# Patient Record
Sex: MALE | Race: WHITE | Employment: FULL TIME | URBAN - NONMETROPOLITAN AREA
[De-identification: names, ages, dates, MRNs, and addresses within clinical notes are randomized per-mention and may not be internally consistent; named-entity substitution may affect disease eponyms.]

---

## 2023-07-18 ENCOUNTER — HOSPITAL ENCOUNTER (OUTPATIENT)
Age: 53
Discharge: HOME | End: 2023-07-18
Payer: COMMERCIAL

## 2023-07-18 LAB
ALT SERPL-CCNC: 30 U/L (ref 5–41)
ANION GAP SERPL CALCULATED.3IONS-SCNC: 11 MMOL/L (ref 9–17)
AST SERPL-CCNC: 26 U/L
BUN SERPL-MCNC: 11 MG/DL (ref 6–20)
BUN/CREAT SERPL: 12 (ref 9–20)
CALCIUM SERPL-MCNC: 9.7 MG/DL (ref 8.6–10.4)
CHLORIDE SERPL-SCNC: 104 MMOL/L (ref 98–107)
CHOLEST SERPL-MCNC: 90 MG/DL
CHOLESTEROL/HDL RATIO: 2.4
CO2 SERPL-SCNC: 28 MMOL/L (ref 20–31)
CREAT SERPL-MCNC: 0.9 MG/DL (ref 0.7–1.2)
GFR SERPL CREATININE-BSD FRML MDRD: >60 ML/MIN/1.73M2
GLUCOSE SERPL-MCNC: 91 MG/DL (ref 70–99)
HDLC SERPL-MCNC: 37 MG/DL
LDLC SERPL CALC-MCNC: 36 MG/DL (ref 0–130)
POTASSIUM SERPL-SCNC: 3.8 MMOL/L (ref 3.7–5.3)
SODIUM SERPL-SCNC: 143 MMOL/L (ref 135–144)
TRIGL SERPL-MCNC: 87 MG/DL

## 2023-07-18 PROCEDURE — 36415 COLL VENOUS BLD VENIPUNCTURE: CPT

## 2023-07-18 PROCEDURE — 84450 TRANSFERASE (AST) (SGOT): CPT

## 2023-07-18 PROCEDURE — 80061 LIPID PANEL: CPT

## 2023-07-18 PROCEDURE — 80048 BASIC METABOLIC PNL TOTAL CA: CPT

## 2023-07-18 PROCEDURE — 84460 ALANINE AMINO (ALT) (SGPT): CPT

## 2023-08-08 ENCOUNTER — APPOINTMENT (OUTPATIENT)
Dept: OPHTHALMOLOGY | Facility: CLINIC | Age: 53
End: 2023-08-08
Payer: COMMERCIAL

## 2023-08-08 ENCOUNTER — NON-APPOINTMENT (OUTPATIENT)
Age: 53
End: 2023-08-08

## 2023-08-08 PROCEDURE — 92004 COMPRE OPH EXAM NEW PT 1/>: CPT

## 2023-10-23 PROBLEM — E78.2 MIXED HYPERLIPIDEMIA: Status: ACTIVE | Noted: 2023-10-23

## 2023-10-23 PROBLEM — I25.10 ATHEROSCLEROSIS OF NATIVE CORONARY ARTERY: Status: ACTIVE | Noted: 2023-10-23

## 2023-10-23 PROBLEM — Z87.891 FORMER SMOKER: Status: ACTIVE | Noted: 2023-10-23

## 2023-10-23 PROBLEM — I25.5 ISCHEMIC CARDIOMYOPATHY: Status: ACTIVE | Noted: 2023-10-23

## 2023-10-23 PROBLEM — I21.3 ST ELEVATION (STEMI) MYOCARDIAL INFARCTION (MULTI): Status: ACTIVE | Noted: 2023-10-23

## 2023-10-23 RX ORDER — ASPIRIN 81 MG/1
81 TABLET ORAL DAILY
COMMUNITY
End: 2024-05-23 | Stop reason: SDUPTHER

## 2023-10-23 RX ORDER — ATORVASTATIN CALCIUM 80 MG/1
80 TABLET, FILM COATED ORAL NIGHTLY
COMMUNITY
End: 2024-05-20 | Stop reason: SDUPTHER

## 2023-10-23 RX ORDER — METOPROLOL TARTRATE 25 MG/1
25 TABLET, FILM COATED ORAL 2 TIMES DAILY
COMMUNITY
End: 2024-05-23 | Stop reason: SDUPTHER

## 2023-10-23 RX ORDER — NITROGLYCERIN 0.4 MG/1
0.4 TABLET SUBLINGUAL EVERY 5 MIN PRN
COMMUNITY

## 2023-10-23 RX ORDER — LISINOPRIL 2.5 MG/1
2.5 TABLET ORAL DAILY
COMMUNITY
End: 2024-05-20 | Stop reason: SDUPTHER

## 2023-10-24 ENCOUNTER — APPOINTMENT (OUTPATIENT)
Dept: OPHTHALMOLOGY | Facility: CLINIC | Age: 53
End: 2023-10-24

## 2023-10-25 ENCOUNTER — OFFICE VISIT (OUTPATIENT)
Dept: CARDIOLOGY | Facility: CLINIC | Age: 53
End: 2023-10-25
Payer: COMMERCIAL

## 2023-10-25 VITALS
BODY MASS INDEX: 25.06 KG/M2 | HEART RATE: 60 BPM | DIASTOLIC BLOOD PRESSURE: 80 MMHG | SYSTOLIC BLOOD PRESSURE: 122 MMHG | HEIGHT: 72 IN | WEIGHT: 185 LBS

## 2023-10-25 DIAGNOSIS — I21.3 ST ELEVATION MYOCARDIAL INFARCTION (STEMI), UNSPECIFIED ARTERY (MULTI): ICD-10-CM

## 2023-10-25 DIAGNOSIS — F17.200 SMOKER: ICD-10-CM

## 2023-10-25 DIAGNOSIS — I25.5 ISCHEMIC CARDIOMYOPATHY: ICD-10-CM

## 2023-10-25 DIAGNOSIS — E78.2 MIXED HYPERLIPIDEMIA: ICD-10-CM

## 2023-10-25 DIAGNOSIS — I25.10 ATHEROSCLEROSIS OF NATIVE CORONARY ARTERY, UNSPECIFIED WHETHER ANGINA PRESENT, UNSPECIFIED WHETHER NATIVE OR TRANSPLANTED HEART: ICD-10-CM

## 2023-10-25 PROBLEM — Z87.891 FORMER SMOKER: Status: RESOLVED | Noted: 2023-10-23 | Resolved: 2023-10-25

## 2023-10-25 PROCEDURE — 99213 OFFICE O/P EST LOW 20 MIN: CPT | Performed by: INTERNAL MEDICINE

## 2023-10-25 PROCEDURE — 99406 BEHAV CHNG SMOKING 3-10 MIN: CPT | Performed by: INTERNAL MEDICINE

## 2023-10-25 PROCEDURE — 4004F PT TOBACCO SCREEN RCVD TLK: CPT | Performed by: INTERNAL MEDICINE

## 2023-10-25 NOTE — PROGRESS NOTES
Subjective   Truong Shanks is a 53 y.o. male       Chief Complaint    Follow-up          53-year-old gentleman returns for follow-up he is doing well he denies any cardiovascular complaints, symptoms, hospitalizations or nitrate usage.  His only complaint is right forearm tenderness and possible brachial radialis tendinitis from overuse syndrome at work.    He is back to smoking 3 cigars a day we counseled on for 3 to 5 minutes on smoking cessation.    She sustained anterior MI in May 2023 with primary revascularization of the mid LAD x2 drug-eluting stents in 1 drug-eluting stent to the diagonal side branch with overall excellent result, left ventricular function last measured during hospitalization was 45 to 50%.  He has no shortness of breath or heart failure or arrhythmias or syncope    Recommendations, continue current therapies, smoking cessation counseling, follow-up in 6 months         Review of Systems   All other systems reviewed and are negative.         Visit Vitals  /80 (BP Location: Left arm, Patient Position: Sitting)   Pulse 60   Ht 1.829 m (6')   Wt 83.9 kg (185 lb)   BMI 25.09 kg/m²   Smoking Status Some Days   BSA 2.06 m²       Objective   Physical Exam  Constitutional:       Appearance: Normal appearance. He is normal weight.   HENT:      Nose: Nose normal.   Neck:      Vascular: No carotid bruit.   Cardiovascular:      Rate and Rhythm: Normal rate.      Pulses: Normal pulses.      Heart sounds: Normal heart sounds.   Pulmonary:      Effort: Pulmonary effort is normal.   Abdominal:      General: Bowel sounds are normal.      Palpations: Abdomen is soft.   Genitourinary:     Rectum: Normal.   Musculoskeletal:         General: Normal range of motion.      Cervical back: Normal range of motion.      Right lower leg: No edema.      Left lower leg: No edema.   Skin:     General: Skin is warm and dry.   Neurological:      General: No focal deficit present.      Mental Status: He is alert.    Psychiatric:         Mood and Affect: Mood normal.         Behavior: Behavior normal.         Thought Content: Thought content normal.         Judgment: Judgment normal.         Current Medications    Current Outpatient Medications:     aspirin 81 mg EC tablet, Take 1 tablet (81 mg) by mouth once daily., Disp: , Rfl:     atorvastatin (Lipitor) 80 mg tablet, Take 1 tablet (80 mg) by mouth once daily at bedtime., Disp: , Rfl:     lisinopril 2.5 mg tablet, Take 1 tablet (2.5 mg) by mouth once daily., Disp: , Rfl:     metoprolol tartrate (Lopressor) 25 mg tablet, Take 1 tablet (25 mg) by mouth 2 times a day., Disp: , Rfl:     nitroglycerin (Nitrostat) 0.4 mg SL tablet, Place 1 tablet (0.4 mg) under the tongue every 5 minutes if needed for chest pain., Disp: , Rfl:     ticagrelor (Brilinta) 90 mg tablet, Take 1 tablet (90 mg) by mouth 2 times a day., Disp: , Rfl:                      Assessment/Plan   1. Atherosclerosis of native coronary artery, unspecified whether angina present, unspecified whether native or transplanted heart        2. ST elevation myocardial infarction (STEMI), unspecified artery (CMS/HCC)        3. Ischemic cardiomyopathy        4. Mixed hyperlipidemia        5. Smoker

## 2024-01-24 ENCOUNTER — TELEPHONE (OUTPATIENT)
Dept: CARDIOLOGY | Facility: CLINIC | Age: 54
End: 2024-01-24
Payer: COMMERCIAL

## 2024-01-24 NOTE — TELEPHONE ENCOUNTER
Attempted to phone patient, no answer and  is not set up unable to leave a message Transferred to [facility] for follow up.

## 2024-01-24 NOTE — TELEPHONE ENCOUNTER
Patient called stating Brilinta is expensive. States was using $5 and can no longer use the card. State cost was around $400 . He will call insurance to inquire about deductible but will inquire about cheaper alternative. To Izabel Santillan NP  in Dr. North Pena, DO absence.

## 2024-01-26 NOTE — TELEPHONE ENCOUNTER
Spoke with patient he states that his friend helpped him and he was able to get his rx from [facility] pharmacy and transfer it to [facility] in [place] which he found out is in his net work and [facility] was not. He also states that he spoke with [facility] and they advised him that the $5 copay card should also work for the Brilinta. He states he should be able to pick this up after 3pm today and will call Monday only if there is an issue with getting the RX.

## 2024-04-16 ENCOUNTER — APPOINTMENT (OUTPATIENT)
Dept: CARDIOLOGY | Facility: CLINIC | Age: 54
End: 2024-04-16
Payer: COMMERCIAL

## 2024-05-20 DIAGNOSIS — I21.3 ST ELEVATION MYOCARDIAL INFARCTION (STEMI), UNSPECIFIED ARTERY (MULTI): ICD-10-CM

## 2024-05-20 DIAGNOSIS — I25.5 ISCHEMIC CARDIOMYOPATHY: ICD-10-CM

## 2024-05-20 DIAGNOSIS — E78.2 MIXED HYPERLIPIDEMIA: ICD-10-CM

## 2024-05-20 RX ORDER — LISINOPRIL 2.5 MG/1
2.5 TABLET ORAL DAILY
Qty: 90 TABLET | Refills: 3 | Status: SHIPPED | OUTPATIENT
Start: 2024-05-20 | End: 2024-05-23 | Stop reason: SDUPTHER

## 2024-05-20 RX ORDER — ATORVASTATIN CALCIUM 80 MG/1
80 TABLET, FILM COATED ORAL NIGHTLY
Qty: 90 TABLET | Refills: 3 | Status: SHIPPED | OUTPATIENT
Start: 2024-05-20 | End: 2024-05-23 | Stop reason: SDUPTHER

## 2024-05-23 ENCOUNTER — OFFICE VISIT (OUTPATIENT)
Dept: CARDIOLOGY | Facility: CLINIC | Age: 54
End: 2024-05-23
Payer: COMMERCIAL

## 2024-05-23 VITALS
HEIGHT: 72 IN | HEART RATE: 150 BPM | SYSTOLIC BLOOD PRESSURE: 108 MMHG | WEIGHT: 186 LBS | DIASTOLIC BLOOD PRESSURE: 82 MMHG | BODY MASS INDEX: 25.19 KG/M2

## 2024-05-23 DIAGNOSIS — I25.5 ISCHEMIC CARDIOMYOPATHY: ICD-10-CM

## 2024-05-23 DIAGNOSIS — E78.2 MIXED HYPERLIPIDEMIA: ICD-10-CM

## 2024-05-23 DIAGNOSIS — I21.3 ST ELEVATION MYOCARDIAL INFARCTION (STEMI), UNSPECIFIED ARTERY (MULTI): ICD-10-CM

## 2024-05-23 DIAGNOSIS — I25.10 CORONARY ARTERIOSCLEROSIS AFTER PERCUTANEOUS TRANSLUMINAL CORONARY ANGIOPLASTY (PTCA): ICD-10-CM

## 2024-05-23 DIAGNOSIS — Z98.61 CORONARY ARTERIOSCLEROSIS AFTER PERCUTANEOUS TRANSLUMINAL CORONARY ANGIOPLASTY (PTCA): ICD-10-CM

## 2024-05-23 DIAGNOSIS — F17.290 CIGAR SMOKER: ICD-10-CM

## 2024-05-23 DIAGNOSIS — I48.92 ATRIAL FLUTTER, UNSPECIFIED TYPE (MULTI): ICD-10-CM

## 2024-05-23 DIAGNOSIS — F17.200 SMOKER: ICD-10-CM

## 2024-05-23 DIAGNOSIS — I25.10 ATHEROSCLEROSIS OF NATIVE CORONARY ARTERY, UNSPECIFIED WHETHER ANGINA PRESENT, UNSPECIFIED WHETHER NATIVE OR TRANSPLANTED HEART: ICD-10-CM

## 2024-05-23 PROCEDURE — 99215 OFFICE O/P EST HI 40 MIN: CPT | Performed by: INTERNAL MEDICINE

## 2024-05-23 PROCEDURE — 4004F PT TOBACCO SCREEN RCVD TLK: CPT | Performed by: INTERNAL MEDICINE

## 2024-05-23 PROCEDURE — 93000 ELECTROCARDIOGRAM COMPLETE: CPT | Performed by: INTERNAL MEDICINE

## 2024-05-23 PROCEDURE — 3008F BODY MASS INDEX DOCD: CPT | Performed by: INTERNAL MEDICINE

## 2024-05-23 RX ORDER — ASPIRIN 81 MG/1
81 TABLET ORAL DAILY
Qty: 90 TABLET | Refills: 3 | Status: SHIPPED | OUTPATIENT
Start: 2024-05-23 | End: 2024-05-29 | Stop reason: SDUPTHER

## 2024-05-23 RX ORDER — METOPROLOL TARTRATE 25 MG/1
25 TABLET, FILM COATED ORAL 2 TIMES DAILY
Qty: 180 TABLET | Refills: 3 | Status: SHIPPED | OUTPATIENT
Start: 2024-05-23 | End: 2025-05-23

## 2024-05-23 RX ORDER — ATORVASTATIN CALCIUM 80 MG/1
80 TABLET, FILM COATED ORAL NIGHTLY
Qty: 90 TABLET | Refills: 3 | Status: SHIPPED | OUTPATIENT
Start: 2024-05-23 | End: 2025-05-23

## 2024-05-23 RX ORDER — AMIODARONE HYDROCHLORIDE 400 MG/1
400 TABLET ORAL DAILY
Qty: 90 TABLET | Refills: 3 | Status: SHIPPED | OUTPATIENT
Start: 2024-05-23 | End: 2025-05-23

## 2024-05-23 RX ORDER — LISINOPRIL 2.5 MG/1
2.5 TABLET ORAL DAILY
Qty: 90 TABLET | Refills: 3 | Status: SHIPPED | OUTPATIENT
Start: 2024-05-23 | End: 2025-05-23

## 2024-05-23 NOTE — PROGRESS NOTES
Subjective   Truong Shanks is a 54 y.o. 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    He is unaware of his tachyarrhythmia, he denies angina or nitrate usage or syncope.  He simply mentions chronic persistent fatigue    He is only getting about 4 hours of sleep at night due to his job, remains on aspirin, atorvastatin, lisinopril, metoprolol and ticagrelor    He sustained anterior MI in May 2023 with primary revascularization of the mid LAD x2 drug-eluting stents in 1 drug-eluting stent to the diagonal side branch with overall excellent result, left ventricular function last measured during hospitalization was 45 to 50%.  He has ongoing chronic tobacco use and we counseled him for 5 minutes today on smoking cessation    We counseled him today to the need for better rate control and rhythm control and recommend he go to the emergency room for admission, off and thus initiation of intravenous diltiazem and Eliquis therapy in hopes of converting him back to normal or at least a controlled rate and rhythm.  Will also initiate Eliquis, will write prescriptions for amiodarone and Eliquis for post discharge treatment, cardioversion and 4 weeks should he not convert with medical therapy and will follow-up thereafterwards.  I will see him in the hospital after admission today         Review of Systems   All other systems reviewed and are negative.           Vitals:    05/23/24 1143   BP: 108/82   BP Location: Left arm   Patient Position: Sitting   Pulse: (!) 150   Weight: 84.4 kg (186 lb)   Height: 1.829 m (6')        Objective   Physical Exam  Constitutional:       Appearance: Normal appearance.   HENT:      Nose: Nose normal.   Neck:      Vascular: No carotid bruit.   Cardiovascular:      Rate and  Rhythm: Normal rate.      Pulses: Normal pulses.      Heart sounds: Normal heart sounds.      Comments: Aflutter on EKG  Pulmonary:      Effort: Pulmonary effort is normal.   Abdominal:      General: Bowel sounds are normal.      Palpations: Abdomen is soft.   Musculoskeletal:         General: Normal range of motion.      Cervical back: Normal range of motion.      Right lower leg: No edema.      Left lower leg: No edema.   Skin:     General: Skin is warm and dry.   Neurological:      General: No focal deficit present.      Mental Status: He is alert.   Psychiatric:         Mood and Affect: Mood normal.         Behavior: Behavior normal.         Thought Content: Thought content normal.         Judgment: Judgment normal.         Allergies  Patient has no known allergies.     Current Medications    Current Outpatient Medications:     aspirin 81 mg EC tablet, Take 1 tablet (81 mg) by mouth once daily., Disp: , Rfl:     atorvastatin (Lipitor) 80 mg tablet, Take 1 tablet (80 mg) by mouth once daily at bedtime., Disp: 90 tablet, Rfl: 3    lisinopril 2.5 mg tablet, Take 1 tablet (2.5 mg) by mouth once daily., Disp: 90 tablet, Rfl: 3    metoprolol tartrate (Lopressor) 25 mg tablet, Take 1 tablet (25 mg) by mouth 2 times a day., Disp: , Rfl:     nitroglycerin (Nitrostat) 0.4 mg SL tablet, Place 1 tablet (0.4 mg) under the tongue every 5 minutes if needed for chest pain., Disp: , Rfl:                      Assessment/Plan   1. Atherosclerosis of native coronary artery, unspecified whether angina present, unspecified whether native or transplanted heart  Follow Up In Cardiology      2. ST elevation myocardial infarction (STEMI), unspecified artery (Multi)  Follow Up In Cardiology      3. Ischemic cardiomyopathy  Follow Up In Cardiology      4. Mixed hyperlipidemia  Follow Up In Cardiology      5. Smoker  Follow Up In Cardiology      6. Coronary arteriosclerosis after percutaneous transluminal coronary angioplasty (PTCA)         7. Atrial flutter, unspecified type (Multi)        8. BMI 25.0-25.9,adult        9. Cigar smoker                 Scribe Attestation  By signing my name below, I, Mark Hays LPNibpromise   attest that this documentation has been prepared under the direction and in the presence of North Pena DO.     Provider Attestation - Scribe documentation    All medical record entries made by the Scribe were at my direction and personally dictated by me. I have reviewed the chart and agree that the record accurately reflects my personal performance of the history, physical exam, discussion and plan.

## 2024-05-23 NOTE — PATIENT INSTRUCTIONS
Please bring all medicines, vitamins, and herbal supplements with you when you come to the office.    Prescriptions will not be filled unless you are compliant with your follow up appointments or have a follow up appointment scheduled as per instruction of your physician. Refills should be requested at the time of your visit.     Patient advised to go directly to [facility] for follow up for Aflutter.

## 2024-05-29 DIAGNOSIS — I25.10 ATHEROSCLEROSIS OF NATIVE CORONARY ARTERY, UNSPECIFIED WHETHER ANGINA PRESENT, UNSPECIFIED WHETHER NATIVE OR TRANSPLANTED HEART: ICD-10-CM

## 2024-05-29 DIAGNOSIS — I25.5 ISCHEMIC CARDIOMYOPATHY: ICD-10-CM

## 2024-05-29 RX ORDER — ASPIRIN 81 MG/1
81 TABLET ORAL DAILY
Qty: 90 TABLET | Refills: 3 | Status: SHIPPED | OUTPATIENT
Start: 2024-05-29 | End: 2025-05-29

## 2024-06-17 DIAGNOSIS — I25.5 ISCHEMIC CARDIOMYOPATHY: ICD-10-CM

## 2024-06-17 DIAGNOSIS — I21.3 ST ELEVATION MYOCARDIAL INFARCTION (STEMI), UNSPECIFIED ARTERY (MULTI): ICD-10-CM

## 2024-06-17 RX ORDER — LOSARTAN POTASSIUM 25 MG/1
25 TABLET ORAL DAILY
COMMUNITY
End: 2024-06-17 | Stop reason: SDUPTHER

## 2024-06-17 RX ORDER — SPIRONOLACTONE 25 MG/1
25 TABLET ORAL DAILY
COMMUNITY
End: 2024-06-17 | Stop reason: SDUPTHER

## 2024-06-17 RX ORDER — METOPROLOL SUCCINATE 50 MG/1
50 TABLET, EXTENDED RELEASE ORAL DAILY
COMMUNITY
End: 2024-06-17 | Stop reason: SDUPTHER

## 2024-06-19 RX ORDER — LOSARTAN POTASSIUM 25 MG/1
25 TABLET ORAL DAILY
Qty: 90 TABLET | Refills: 0 | Status: SHIPPED | OUTPATIENT
Start: 2024-06-19

## 2024-06-19 RX ORDER — SPIRONOLACTONE 25 MG/1
25 TABLET ORAL DAILY
Qty: 90 TABLET | Refills: 0 | Status: SHIPPED | OUTPATIENT
Start: 2024-06-19

## 2024-06-19 RX ORDER — METOPROLOL SUCCINATE 50 MG/1
50 TABLET, EXTENDED RELEASE ORAL DAILY
Qty: 90 TABLET | Refills: 0 | Status: SHIPPED | OUTPATIENT
Start: 2024-06-19

## 2024-06-24 ENCOUNTER — APPOINTMENT (OUTPATIENT)
Dept: CARDIOLOGY | Facility: CLINIC | Age: 54
End: 2024-06-24
Payer: COMMERCIAL

## 2024-06-24 ENCOUNTER — TELEPHONE (OUTPATIENT)
Dept: CARDIOLOGY | Facility: CLINIC | Age: 54
End: 2024-06-24

## 2024-06-24 VITALS
BODY MASS INDEX: 24.71 KG/M2 | HEART RATE: 55 BPM | HEIGHT: 72 IN | DIASTOLIC BLOOD PRESSURE: 78 MMHG | SYSTOLIC BLOOD PRESSURE: 120 MMHG | WEIGHT: 182.4 LBS

## 2024-06-24 DIAGNOSIS — I25.5 ISCHEMIC CARDIOMYOPATHY: ICD-10-CM

## 2024-06-24 DIAGNOSIS — I48.92 ATRIAL FLUTTER, UNSPECIFIED TYPE (MULTI): ICD-10-CM

## 2024-06-24 DIAGNOSIS — I25.10 ATHEROSCLEROSIS OF NATIVE CORONARY ARTERY, UNSPECIFIED WHETHER ANGINA PRESENT, UNSPECIFIED WHETHER NATIVE OR TRANSPLANTED HEART: ICD-10-CM

## 2024-06-24 DIAGNOSIS — F17.290 CIGAR SMOKER: ICD-10-CM

## 2024-06-24 DIAGNOSIS — I10 ESSENTIAL HYPERTENSION: ICD-10-CM

## 2024-06-24 DIAGNOSIS — Z79.899 HIGH RISK MEDICATION USE: ICD-10-CM

## 2024-06-24 DIAGNOSIS — E78.2 MIXED HYPERLIPIDEMIA: ICD-10-CM

## 2024-06-24 DIAGNOSIS — Z79.01 LONG TERM CURRENT USE OF ANTICOAGULANT THERAPY: ICD-10-CM

## 2024-06-24 DIAGNOSIS — I48.92 ATRIAL FLUTTER, UNSPECIFIED TYPE (MULTI): Primary | ICD-10-CM

## 2024-06-24 PROCEDURE — 3074F SYST BP LT 130 MM HG: CPT | Performed by: NURSE PRACTITIONER

## 2024-06-24 PROCEDURE — 99214 OFFICE O/P EST MOD 30 MIN: CPT | Performed by: NURSE PRACTITIONER

## 2024-06-24 PROCEDURE — 93000 ELECTROCARDIOGRAM COMPLETE: CPT | Performed by: NURSE PRACTITIONER

## 2024-06-24 PROCEDURE — 3078F DIAST BP <80 MM HG: CPT | Performed by: NURSE PRACTITIONER

## 2024-06-24 PROCEDURE — 3008F BODY MASS INDEX DOCD: CPT | Performed by: NURSE PRACTITIONER

## 2024-06-24 RX ORDER — IBUPROFEN 200 MG
1 TABLET ORAL EVERY 24 HOURS
Qty: 30 PATCH | Refills: 0 | Status: SHIPPED | OUTPATIENT
Start: 2024-06-24 | End: 2024-07-24

## 2024-06-24 RX ORDER — AMIODARONE HYDROCHLORIDE 200 MG/1
200 TABLET ORAL DAILY
Qty: 30 TABLET | Refills: 5 | Status: SHIPPED | OUTPATIENT
Start: 2024-06-24 | End: 2024-12-21

## 2024-06-24 RX ORDER — DILTIAZEM HYDROCHLORIDE 240 MG/1
1 CAPSULE, COATED, EXTENDED RELEASE ORAL
COMMUNITY
Start: 2024-05-28 | End: 2024-06-24 | Stop reason: SDUPTHER

## 2024-06-24 RX ORDER — DILTIAZEM HYDROCHLORIDE 240 MG/1
240 CAPSULE, COATED, EXTENDED RELEASE ORAL
Qty: 30 CAPSULE | Refills: 11 | Status: SHIPPED | OUTPATIENT
Start: 2024-06-24 | End: 2025-06-24

## 2024-06-24 NOTE — TELEPHONE ENCOUNTER
Patient phoned requesting doctors excuse for 1 week leading up to his cardioversion as well as a written excuse for his girlfriend for the day of the procedure. Please advise.    To Izabel Santillan NP for review.

## 2024-06-24 NOTE — LETTER
"June 25, 2024     Kalyani Wills, APRN-CNP  [address] [place] [address]    Patient: Truong Shanks   YOB: 1970   Date of Visit: 6/24/2024       Dear Dr. Kalyani Wills, APRN-CNP:    Thank you for referring Truong Shanks to me for evaluation. Below are my notes for this consultation.  If you have questions, please do not hesitate to call me. I look forward to following your patient along with you.       Sincerely,     Izabel Olmos, APRN-CNP      CC: No Recipients  ______________________________________________________________________________________    Chief Complaint  \" Doing okay I guess\"    Reason for Visit  Patient presents to the office today for outpatient follow-up for hospital follow-up.  Last evaluated in clinic by Dr. Pena May 2024.  At that time, noted to have a flutter with 2-1 block.  Sent to local emergency department and admitted at [facility].    Patient was recently hospitalized at [facility].  The patient was seen in Cardiology consult with subsequent cardiovascular management by [facility].  Hospitalization records have been reviewed.   Reason for Cardiology Consultation: Atrial flutter with RVR  Consulting Cardiologist: Dr. Pena  Cardiovascular testing: Echocardiogram with LVEF 45 to 50%.  Changes to cardiovascular medical regimen at time of discharge: Amiodarone 4 mg daily, Eliquis twice daily, diltiazem to 40 mg daily, Toprol increased to 50 mg daily.  Spironolactone and Cozaar also added.  Discharge disposition: Home    Presents today ambulatory with steady gait.  Recently established with PCP.    History of Present Illness   Patient is a pleasant 54-year-old gentleman who presents to the office today reports mild dyspnea on exertion, minimal fatigability.  He does report that following hospitalization he had 2 nights where he woke up with PND but denies any recurrent.  He ambulated in from the parking lot and\" felt fine\".  " He reports compliance with medication.    Reviewed in detail the pathophysiology of atrial fibrillation, rationale behind amiodarone and anticoagulation. Due to symptomatic atrial fibrillation in the setting of appropriate anticoagulation, the risk benefits and procedure of cardioversion were discussed including, but not limited to anesthesia reaction, irritation at defibrillator site and progression to high grade heart block requiring implant of temporary pacemaker.  Patient is in agreement to proceed.  Procedure will be scheduled.     Discussed our initial goal is to resume normal sinus rhythm.  He will then referred to Dr. Mei to discuss atrial flutter ablation and attempt to get off of high risk medication use and possibly anticoagulants.    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  Daily activity includes ADLs, housework and yard work.  He denies any exertional symptoms or reminiscent symptoms of MI.    On record review his normal sinus heart rate was 52 on Lopressor 25 mg twice daily.  He will hold Cardizem and Toprol day prior and morning of cardioversion.  Will reduce amiodarone down to 200 mg daily.  To date, has received greater than 12 g of loading dose.  AV leobardo blocking agents will be adjusted following cardioversion.    Patient reports that overall has no complaint(s) of chest pain, chest pressure/discomfort, claudication, exertional chest pressure/discomfort, lower extremity edema, and near-syncope or has complaint(s) of dyspnea and fatigue.    Daily activity:    Works at a local [employer], yard work, ADLs.  Denies any change in exercise capacity or functional tolerance since last office visit.    The importance of secondary prevention reviewed:  HTN: Optimal in office  HLD: Treated, will provide for lipids follow-up.  DM: Denies  Smoker: Unfortunately, has started smoking cigars again.  BMI:  Reviewed the merits of healthy lifestyle choices on overall cardiovascular health.    Review of  Systems   Constitutional: Positive for malaise/fatigue.   Eyes:  Negative for vision loss in right eye.   Cardiovascular:  Positive for dyspnea on exertion. Negative for chest pain, irregular heartbeat, leg swelling, near-syncope, orthopnea, palpitations, paroxysmal nocturnal dyspnea and syncope.        Visit Vitals  /78 (BP Location: Left arm, Patient Position: Sitting)   Pulse 55   Ht 1.829 m (6')   Wt 82.7 kg (182 lb 6.4 oz)   BMI 24.74 kg/m²   Smoking Status Some Days   BSA 2.05 m²     Physical Exam  Vitals and nursing note reviewed.   Constitutional:       Appearance: Normal appearance.   Cardiovascular:      Rate and Rhythm: Normal rate. Rhythm irregularly irregular.      Heart sounds: Normal heart sounds.   Pulmonary:      Effort: Pulmonary effort is normal.      Breath sounds: Normal breath sounds.   Musculoskeletal:      Cervical back: Full passive range of motion without pain.      Right lower leg: No edema.      Left lower leg: No edema.   Skin:     General: Skin is cool.   Neurological:      Mental Status: He is alert and oriented to person, place, and time.   Psychiatric:         Attention and Perception: Attention normal.         Mood and Affect: Mood normal.         Behavior: Behavior is cooperative.      No Known Allergies    Current Outpatient Medications   Medication Instructions   • amiodarone (PACERONE) 200 mg, oral, Daily   • apixaban (ELIQUIS) 5 mg, oral, 2 times daily   • aspirin 81 mg, oral, Daily   • atorvastatin (LIPITOR) 80 mg, oral, Nightly   • dilTIAZem CD (CARDIZEM CD) 240 mg, oral, Daily (0630)   • losartan (COZAAR) 25 mg, oral, Daily   • metoprolol succinate XL (TOPROL-XL) 50 mg, oral, Daily   • nicotine (Nicoderm CQ) 21 mg/24 hr patch 1 patch, transdermal, Every 24 hours   • nitroglycerin (NITROSTAT) 0.4 mg, sublingual, Every 5 min PRN   • spironolactone (ALDACTONE) 25 mg, oral, Daily      Assessment:    Cigar smoker  5 cigars per day    Quit for 3 months cold turkey after  "STEMI    Continued every day tobacco use. Have reviewed the negative cardiovascular impact of nicotine. Continues to decline pharmacological assistance.        High risk medication use  Amiodarone start date May 25, 2024  EKG in office underlying atrial flutter with variable AV block ventricular rate 55  QTc 461 on amiodarone 400 mg daily    Atherosclerosis of native coronary artery  May 18, 2023 presented to [facility] managed emergently by Dr. Pena with door to balloon time 62 minutes due to anterior STEMI.  His presenting complaint was\" chest racing, in retrospect he reported dyspnea on exertion and fatigability.    May 18, 2023  Proximal/mid LAD PCI/Damien 3.5 x 18 mm and 2.75 x 28 mm  Ostial/proximal diagonal PCI/Damien 2.5 x 18 mm  Circumflex normal  RCA normal    Current daily activity greater than 4 METS without concerning symptoms  No rate related angina with a flutter with 2-1 block    Ischemic cardiomyopathy  May 2023 cardiac cath at time of STEMI LVEF 55%  May 2023 post PCI TTE LVEF 45 to 50%  August 2023 TTE LVEF 55%    May 2024 TTE at time of A-flutter LVEF 45 to 50%    Mixed hyperlipidemia  High intensity statin    Atrial flutter (Multi)  Initial diagnosis May 2024 office visit at which time he presented with 2-1 atrial flutter with mild symptoms.  Subsequently hospitalized and initiated on amiodarone, increase Toprol and addition of diltiazem.    He has now been anticoagulated for weeks.  Will proceed with cardioversion.  Will refer to Dr. Mei for a flutter ablation    Essential hypertension  Optimal in office    Long term current use of anticoagulant therapy  CHADS VASc 3 (htn, CM, STEMI) anticoagulated full dose Eliquis age 54, weight 82 kg.  Start date May 25, 2024  Denies bleeding diatheses    BMI 24.0-24.9, adult  Reviewed the merits of healthy lifestyle choices on overall cardiovascular health.      Plan:     Through informed decision making process incorporating patients unique circumstances, the " following treatment plan will be initiated:    1.  Prescription drug management of cardiovascular medication for efficacy, adherence to treatment, side effect assessment and polypharmacy. Current treatment clinically warranted and to continue with following modifications:     -  Reduce amiodarone 200mg daily     2.  Cardioversion with Dr. Dickerson with labs prior  You will need to hold metoprolol and Cardizem CD the day prior and morning of procedure  Do not skip any doses of Eliquis - take morning of procedure    3.  Referral to Dr. Mei for atrial flutter ablation    4.  ECG one week after cardioversion     5.  Return for follow-up; in the interim, contact the office if new symptoms arise.  Dr. Pena 2 months    6.  I sent in script for nicoderm patches    You need to stop smoking.  Though it is not easy, more than half of all adults smokers have quit.  We encourage you to write down all the reasons you should quit smoking and set a quit date for yourself.  Ask us how we can help.  You may also call 8-572-QUIT-NOW for free resources and assistance.    Izabel Olmos  MSN, APRN-CNP, PMHNP-BC  [facility]    Please excuse any errors in grammar or translation related to this dictation. Voice recognition software was utilized to prepare this document.

## 2024-06-24 NOTE — PATIENT INSTRUCTIONS
Please bring all medicines, vitamins, and herbal supplements with you when you come to the office.    Prescriptions will not be filled unless you are compliant with your follow up appointments or have a follow up appointment scheduled as per instruction of your physician. Refills should be requested at the time of your visit.     PLAN:   Through informed decision making process incorporating patients unique circumstances, the following treatment plan will be initiated:    1.  Prescription drug management of cardiovascular medication for efficacy, adherence to treatment, side effect assessment and polypharmacy. Current treatment clinically warranted and to continue with following modifications:     -  Reduce amiodarone 200mg daily     2.  Cardioversion with Dr. Dickerson with labs prior  You will need to hold metoprolol and Cardizem CD the day prior and morning of procedure  Do not skip any doses of Eliquis - take morning of procedure    3.  Referral to Dr. Mei for atrial flutter ablation    4.  ECG one week after cardioversion     5.  Return for follow-up; in the interim, contact the office if new symptoms arise.  Dr. Pena 2 months    6.  I sent in script for nicoderm patches    You need to stop smoking.  Though it is not easy, more than half of all adults smokers have quit.  We encourage you to write down all the reasons you should quit smoking and set a quit date for yourself.  Ask us how we can help.  You may also call 7-800-QUIT-NOW for free resources and assistance.

## 2024-06-24 NOTE — ASSESSMENT & PLAN NOTE
5 cigars per day    Quit for 3 months cold turkey after STEMI    Continued every day tobacco use. Have reviewed the negative cardiovascular impact of nicotine. Continues to decline pharmacological assistance.

## 2024-06-25 DIAGNOSIS — I25.10 ATHEROSCLEROSIS OF NATIVE CORONARY ARTERY, UNSPECIFIED WHETHER ANGINA PRESENT, UNSPECIFIED WHETHER NATIVE OR TRANSPLANTED HEART: ICD-10-CM

## 2024-06-25 RX ORDER — AMIODARONE HYDROCHLORIDE 200 MG/1
200 TABLET ORAL DAILY
Qty: 90 TABLET | OUTPATIENT
Start: 2024-06-25

## 2024-06-25 ASSESSMENT — ENCOUNTER SYMPTOMS
PALPITATIONS: 0
RIGHT EYE: 0
IRREGULAR HEARTBEAT: 0
NEAR-SYNCOPE: 0
PND: 0
DYSPNEA ON EXERTION: 1
ORTHOPNEA: 0
SYNCOPE: 0

## 2024-06-25 NOTE — ASSESSMENT & PLAN NOTE
Initial diagnosis May 2024 office visit at which time he presented with 2-1 atrial flutter with mild symptoms.  Subsequently hospitalized and initiated on amiodarone, increase Toprol and addition of diltiazem.    He has now been anticoagulated for weeks.  Will proceed with cardioversion.  Will refer to Dr. Mei for a flutter ablation

## 2024-06-25 NOTE — ASSESSMENT & PLAN NOTE
CHADS VASc 3 (htn, CM, STEMI) anticoagulated full dose Eliquis age 54, weight 82 kg.  Start date May 25, 2024  Denies bleeding diatheses

## 2024-06-25 NOTE — ASSESSMENT & PLAN NOTE
Amiodarone start date May 25, 2024  EKG in office underlying atrial flutter with variable AV block ventricular rate 55  QTc 461 on amiodarone 400 mg daily

## 2024-06-25 NOTE — PROGRESS NOTES
"Chief Complaint  \" Doing okay I guess\"    Reason for Visit  Patient presents to the office today for outpatient follow-up for hospital follow-up.  Last evaluated in clinic by Dr. Pena May 2024.  At that time, noted to have a flutter with 2-1 block.  Sent to local emergency department and admitted at [facility] (formerly [facility]).    Patient was recently hospitalized at [facility].  The patient was seen in Cardiology consult with subsequent cardiovascular management by [facility].  Hospitalization records have been reviewed.   Reason for Cardiology Consultation: Atrial flutter with RVR  Consulting Cardiologist: Dr. Pena  Cardiovascular testing: Echocardiogram with LVEF 45 to 50%.  Changes to cardiovascular medical regimen at time of discharge: Amiodarone 4 mg daily, Eliquis twice daily, diltiazem to 40 mg daily, Toprol increased to 50 mg daily.  Spironolactone and Cozaar also added.  Discharge disposition: Home    Presents today ambulatory with steady gait.  Recently established with PCP.    History of Present Illness   Patient is a pleasant 54-year-old gentleman who presents to the office today reports mild dyspnea on exertion, minimal fatigability.  He does report that following hospitalization he had 2 nights where he woke up with PND but denies any recurrent.  He ambulated in from the parking lot and\" felt fine\".  He reports compliance with medication.    Reviewed in detail the pathophysiology of atrial fibrillation, rationale behind amiodarone and anticoagulation. Due to symptomatic atrial fibrillation in the setting of appropriate anticoagulation, the risk benefits and procedure of cardioversion were discussed including, but not limited to anesthesia reaction, irritation at defibrillator site and progression to high grade heart block requiring implant of temporary pacemaker.  Patient is in agreement to proceed.  Procedure will be scheduled.     Discussed our initial goal is to resume normal sinus " rhythm.  He will then referred to Dr. Mei to discuss atrial flutter ablation and attempt to get off of high risk medication use and possibly anticoagulants.    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  Daily activity includes ADLs, housework and yard work.  He denies any exertional symptoms or reminiscent symptoms of MI.    On record review his normal sinus heart rate was 52 on Lopressor 25 mg twice daily.  He will hold Cardizem and Toprol day prior and morning of cardioversion.  Will reduce amiodarone down to 200 mg daily.  To date, has received greater than 12 g of loading dose.  AV leobardo blocking agents will be adjusted following cardioversion.    Patient reports that overall has no complaint(s) of chest pain, chest pressure/discomfort, claudication, exertional chest pressure/discomfort, lower extremity edema, and near-syncope or has complaint(s) of dyspnea and fatigue.    Daily activity:    Works at a local [employer], yard work, ADLs.  Denies any change in exercise capacity or functional tolerance since last office visit.    The importance of secondary prevention reviewed:  HTN: Optimal in office  HLD: Treated, will provide for lipids follow-up.  DM: Denies  Smoker: Unfortunately, has started smoking cigars again.  BMI:  Reviewed the merits of healthy lifestyle choices on overall cardiovascular health.    Review of Systems   Constitutional: Positive for malaise/fatigue.   Eyes:  Negative for vision loss in right eye.   Cardiovascular:  Positive for dyspnea on exertion. Negative for chest pain, irregular heartbeat, leg swelling, near-syncope, orthopnea, palpitations, paroxysmal nocturnal dyspnea and syncope.        Visit Vitals  /78 (BP Location: Left arm, Patient Position: Sitting)   Pulse 55   Ht 1.829 m (6')   Wt 82.7 kg (182 lb 6.4 oz)   BMI 24.74 kg/m²   Smoking Status Some Days   BSA 2.05 m²     Physical Exam  Vitals and nursing note reviewed.   Constitutional:       Appearance: Normal  "appearance.   Cardiovascular:      Rate and Rhythm: Normal rate. Rhythm irregularly irregular.      Heart sounds: Normal heart sounds.   Pulmonary:      Effort: Pulmonary effort is normal.      Breath sounds: Normal breath sounds.   Musculoskeletal:      Cervical back: Full passive range of motion without pain.      Right lower leg: No edema.      Left lower leg: No edema.   Skin:     General: Skin is cool.   Neurological:      Mental Status: He is alert and oriented to person, place, and time.   Psychiatric:         Attention and Perception: Attention normal.         Mood and Affect: Mood normal.         Behavior: Behavior is cooperative.      No Known Allergies    Current Outpatient Medications   Medication Instructions    amiodarone (PACERONE) 200 mg, oral, Daily    apixaban (ELIQUIS) 5 mg, oral, 2 times daily    aspirin 81 mg, oral, Daily    atorvastatin (LIPITOR) 80 mg, oral, Nightly    dilTIAZem CD (CARDIZEM CD) 240 mg, oral, Daily (0630)    losartan (COZAAR) 25 mg, oral, Daily    metoprolol succinate XL (TOPROL-XL) 50 mg, oral, Daily    nicotine (Nicoderm CQ) 21 mg/24 hr patch 1 patch, transdermal, Every 24 hours    nitroglycerin (NITROSTAT) 0.4 mg, sublingual, Every 5 min PRN    spironolactone (ALDACTONE) 25 mg, oral, Daily      Assessment:    Cigar smoker  5 cigars per day    Quit for 3 months cold turkey after STEMI    Continued every day tobacco use. Have reviewed the negative cardiovascular impact of nicotine. Continues to decline pharmacological assistance.        High risk medication use  Amiodarone start date May 25, 2024  EKG in office underlying atrial flutter with variable AV block ventricular rate 55  QTc 461 on amiodarone 400 mg daily    Atherosclerosis of native coronary artery  May 18, 2023 presented to [facility] managed emergently by Dr. Pena with door to balloon time 62 minutes due to anterior STEMI.  His presenting complaint was\" chest racing, in retrospect he reported dyspnea on exertion and " fatigability.    May 18, 2023  Proximal/mid LAD PCI/[place] 3.5 x 18 mm and 2.75 x 28 mm  Ostial/proximal diagonal PCI/[place] 2.5 x 18 mm  Circumflex normal  RCA normal    Current daily activity greater than 4 METS without concerning symptoms  No rate related angina with a flutter with 2-1 block    Ischemic cardiomyopathy  May 2023 cardiac cath at time of STEMI LVEF 55%  May 2023 post PCI TTE LVEF 45 to 50%  August 2023 TTE LVEF 55%    May 2024 TTE at time of A-flutter LVEF 45 to 50%    Mixed hyperlipidemia  High intensity statin    Atrial flutter (Multi)  Initial diagnosis May 2024 office visit at which time he presented with 2-1 atrial flutter with mild symptoms.  Subsequently hospitalized and initiated on amiodarone, increase Toprol and addition of diltiazem.    He has now been anticoagulated for weeks.  Will proceed with cardioversion.  Will refer to Dr. Mei for a flutter ablation    Essential hypertension  Optimal in office    Long term current use of anticoagulant therapy  CHADS VASc 3 (htn, CM, STEMI) anticoagulated full dose Eliquis age 54, weight 82 kg.  Start date May 25, 2024  Denies bleeding diatheses    BMI 24.0-24.9, adult  Reviewed the merits of healthy lifestyle choices on overall cardiovascular health.      Plan:     Through informed decision making process incorporating patients unique circumstances, the following treatment plan will be initiated:    1.  Prescription drug management of cardiovascular medication for efficacy, adherence to treatment, side effect assessment and polypharmacy. Current treatment clinically warranted and to continue with following modifications:     -  Reduce amiodarone 200mg daily     2.  Cardioversion with Dr. Dickerson with labs prior  You will need to hold metoprolol and Cardizem CD the day prior and morning of procedure  Do not skip any doses of Eliquis - take morning of procedure    3.  Referral to Dr. Mei for atrial flutter ablation    4.  ECG one week after  cardioversion     5.  Return for follow-up; in the interim, contact the office if new symptoms arise.  Dr. Pena 2 months    6.  I sent in script for nicoderm patches    You need to stop smoking.  Though it is not easy, more than half of all adults smokers have quit.  We encourage you to write down all the reasons you should quit smoking and set a quit date for yourself.  Ask us how we can help.  You may also call [employer] for free resources and assistance.    Izabel Olmos  MSN, APRN-CNP, PMHNP-BC  [facility]    Please excuse any errors in grammar or translation related to this dictation. Voice recognition software was utilized to prepare this document.

## 2024-06-25 NOTE — ASSESSMENT & PLAN NOTE
May 2023 cardiac cath at time of STEMI LVEF 55%  May 2023 post PCI TTE LVEF 45 to 50%  August 2023 TTE LVEF 55%    May 2024 TTE at time of A-flutter LVEF 45 to 50%

## 2024-06-25 NOTE — ASSESSMENT & PLAN NOTE
"May 18, 2023 presented to [facility] managed emergently by Dr. Pena with door to balloon time 62 minutes due to anterior STEMI.  His presenting complaint was\" chest racing, in retrospect he reported dyspnea on exertion and fatigability.    May 18, 2023  Proximal/mid LAD PCI/[place] 3.5 x 18 mm and 2.75 x 28 mm  Ostial/proximal diagonal PCI/[place] 2.5 x 18 mm  Circumflex normal  RCA normal    Current daily activity greater than 4 METS without concerning symptoms  No rate related angina with a flutter with 2-1 block  "

## 2024-06-27 LAB
NON-UH HIE ANION GAP: 11.2 (ref 6–15)
NON-UH HIE CARBON DIOXIDE: 22.7 MMOL/L (ref 21–31)
NON-UH HIE CHLORIDE: 108 MMOL/L (ref 98–107)
NON-UH HIE POTASSIUM: 3.9 MMOL/L (ref 3.5–5.1)
NON-UH HIE SODIUM: 138 MMOL/L (ref 136–145)

## 2024-06-27 RX ORDER — NITROGLYCERIN 0.4 MG/1
0.4 TABLET SUBLINGUAL EVERY 5 MIN PRN
Qty: 25 TABLET | Refills: 11 | Status: SHIPPED | OUTPATIENT
Start: 2024-06-27

## 2024-07-05 ENCOUNTER — APPOINTMENT (OUTPATIENT)
Dept: CARDIOLOGY | Facility: CLINIC | Age: 54
End: 2024-07-05
Payer: COMMERCIAL

## 2024-07-05 VITALS — DIASTOLIC BLOOD PRESSURE: 60 MMHG | HEART RATE: 49 BPM | SYSTOLIC BLOOD PRESSURE: 108 MMHG

## 2024-07-05 DIAGNOSIS — Z79.899 HIGH RISK MEDICATION USE: ICD-10-CM

## 2024-07-05 DIAGNOSIS — I48.92 ATRIAL FLUTTER, UNSPECIFIED TYPE (MULTI): ICD-10-CM

## 2024-07-05 NOTE — PROGRESS NOTES
"Patient here for at EKG visit ordered by Izabel Olmos NP due to Atrial Flutter. Dr. Soni in suite. Patient here due to S/P DCC. Medication list Updated verbally. Patient had no cardiac complaints. Patient explained he feels much better, like he can \"breathe again\". Discussed with KIRSTIN Marion RN prior to discharge.     To Dr. Nahid Soni MD for review  To Dr. North Pena, DO to read        Vitals:    07/05/24 1004   BP: 108/60   BP Location: Right arm   Patient Position: Sitting   Pulse: (!) 49       "

## 2024-07-17 ENCOUNTER — CLINICAL DOCUMENTATION (OUTPATIENT)
Dept: PALLATIVE CARE | Age: 54
End: 2024-07-17

## 2024-07-17 NOTE — PROGRESS NOTES
Arrives as OP for ACP document completion. Documents completed. Health care decision makers updated. Documents scanned into EHR.     Justina Cerna RN  [facility]   Palliative Care Nurse Coordinator  7/17/2024 7:37 AM

## 2024-09-03 PROBLEM — R06.2 SYMPTOM OF WHEEZING: Status: ACTIVE | Noted: 2024-07-01

## 2024-09-03 PROBLEM — R06.02 SHORTNESS OF BREATH: Status: ACTIVE | Noted: 2024-06-20

## 2024-09-03 PROBLEM — F17.200 NICOTINE DEPENDENCE: Status: ACTIVE | Noted: 2024-06-20

## 2024-09-03 PROBLEM — J44.9 CHRONIC OBSTRUCTIVE PULMONARY DISEASE (MULTI): Status: ACTIVE | Noted: 2024-07-01

## 2024-09-03 PROBLEM — Z98.61 STATUS POST PERCUTANEOUS TRANSLUMINAL CORONARY ANGIOPLASTY: Status: ACTIVE | Noted: 2024-05-23

## 2024-09-03 PROBLEM — I42.9 CARDIOMYOPATHY (MULTI): Status: ACTIVE | Noted: 2024-05-23

## 2024-09-04 ENCOUNTER — APPOINTMENT (OUTPATIENT)
Dept: CARDIOLOGY | Facility: CLINIC | Age: 54
End: 2024-09-04
Payer: COMMERCIAL

## 2024-09-04 VITALS
HEART RATE: 55 BPM | WEIGHT: 186 LBS | BODY MASS INDEX: 25.19 KG/M2 | HEIGHT: 72 IN | DIASTOLIC BLOOD PRESSURE: 64 MMHG | SYSTOLIC BLOOD PRESSURE: 102 MMHG

## 2024-09-04 DIAGNOSIS — Z79.899 HIGH RISK MEDICATION USE: ICD-10-CM

## 2024-09-04 DIAGNOSIS — I48.92 ATRIAL FLUTTER, UNSPECIFIED TYPE (MULTI): ICD-10-CM

## 2024-09-04 DIAGNOSIS — R05.9 COUGH, UNSPECIFIED TYPE: ICD-10-CM

## 2024-09-04 DIAGNOSIS — J44.9 CHRONIC OBSTRUCTIVE PULMONARY DISEASE, UNSPECIFIED COPD TYPE (MULTI): ICD-10-CM

## 2024-09-04 DIAGNOSIS — I25.10 ATHEROSCLEROSIS OF NATIVE CORONARY ARTERY, UNSPECIFIED WHETHER ANGINA PRESENT, UNSPECIFIED WHETHER NATIVE OR TRANSPLANTED HEART: ICD-10-CM

## 2024-09-04 DIAGNOSIS — Z98.61 STATUS POST PERCUTANEOUS TRANSLUMINAL CORONARY ANGIOPLASTY: ICD-10-CM

## 2024-09-04 DIAGNOSIS — I25.5 ISCHEMIC CARDIOMYOPATHY: ICD-10-CM

## 2024-09-04 DIAGNOSIS — E78.2 MIXED HYPERLIPIDEMIA: ICD-10-CM

## 2024-09-04 DIAGNOSIS — F17.290 CIGAR SMOKER: ICD-10-CM

## 2024-09-04 DIAGNOSIS — I25.2 HX OF ACUTE MYOCARDIAL INFARCTION: ICD-10-CM

## 2024-09-04 DIAGNOSIS — Z98.61 CORONARY ARTERIOSCLEROSIS AFTER PERCUTANEOUS TRANSLUMINAL CORONARY ANGIOPLASTY (PTCA): ICD-10-CM

## 2024-09-04 DIAGNOSIS — I21.3 ST ELEVATION MYOCARDIAL INFARCTION (STEMI), UNSPECIFIED ARTERY (MULTI): ICD-10-CM

## 2024-09-04 DIAGNOSIS — I10 ESSENTIAL HYPERTENSION: ICD-10-CM

## 2024-09-04 DIAGNOSIS — I25.10 CORONARY ARTERIOSCLEROSIS AFTER PERCUTANEOUS TRANSLUMINAL CORONARY ANGIOPLASTY (PTCA): ICD-10-CM

## 2024-09-04 PROCEDURE — 99214 OFFICE O/P EST MOD 30 MIN: CPT | Performed by: INTERNAL MEDICINE

## 2024-09-04 PROCEDURE — 99406 BEHAV CHNG SMOKING 3-10 MIN: CPT | Performed by: INTERNAL MEDICINE

## 2024-09-04 PROCEDURE — 3078F DIAST BP <80 MM HG: CPT | Performed by: INTERNAL MEDICINE

## 2024-09-04 PROCEDURE — 4004F PT TOBACCO SCREEN RCVD TLK: CPT | Performed by: INTERNAL MEDICINE

## 2024-09-04 PROCEDURE — 93000 ELECTROCARDIOGRAM COMPLETE: CPT | Performed by: INTERNAL MEDICINE

## 2024-09-04 PROCEDURE — 3008F BODY MASS INDEX DOCD: CPT | Performed by: INTERNAL MEDICINE

## 2024-09-04 PROCEDURE — 3074F SYST BP LT 130 MM HG: CPT | Performed by: INTERNAL MEDICINE

## 2024-09-04 RX ORDER — AMIODARONE HYDROCHLORIDE 100 MG/1
100 TABLET ORAL DAILY
Qty: 90 TABLET | Refills: 3 | Status: SHIPPED | OUTPATIENT
Start: 2024-09-04 | End: 2025-09-04

## 2024-09-04 RX ORDER — ALBUTEROL SULFATE 90 UG/1
2 INHALANT RESPIRATORY (INHALATION) EVERY 4 HOURS PRN
COMMUNITY

## 2024-09-04 RX ORDER — BUDESONIDE AND FORMOTEROL FUMARATE DIHYDRATE 160; 4.5 UG/1; UG/1
2 AEROSOL RESPIRATORY (INHALATION) 2 TIMES DAILY
COMMUNITY

## 2024-09-04 NOTE — PROGRESS NOTES
Subjective   Truong Shanks is a 54 y.o. male       Chief Complaint    Follow-up          54-year-old gentleman returns for follow-up he is doing well he has no recurrence of atrial fibrillation, no hospitalizations and no angina or nitrate usage.  He is still smoking and we counseled him for 5 to 10 minutes today in smoking cessation.  He has not had any amiodarone surveillance testing as well.    He sustained anterior MI in May 2023 with primary revascularization of the mid LAD x2 drug-eluting stents in 1 drug-eluting stent to the diagonal side branch with overall excellent result, left ventricular function last measured during hospitalization was 45 to 50%.  He has ongoing chronic tobacco use and we counseled him for 5 minutes today on smoking cessation    His main complaint is cough due to irritability of his throat it is episodic in nature but when he begins his coughing spell at his prolonged.    Today he is in normal sinus rhythm currently on amiodarone 200 daily, ECG with normal sinus rhythm left atrial enlargement, anteroseptal infarction pattern of unknown age, abnormal ECG    Recommendations: Decrease amiodarone to 100 daily, obtain amiodarone surveillance labs and pulmonary function testing, stop losartan for 4 weeks to see how his cough symptoms respond if no response, reinitiate losartan, follow-up in 6 months.    We did discuss possibility of ablation for atrial flutter but at this juncture he is in sinus rhythm doing well and gave him the option of waiting to see if atrial flutter comes back and if so we can pull the trigger on referral to EP at that time         Review of Systems   All other systems reviewed and are negative.           Vitals:    09/04/24 0909   BP: 102/64   BP Location: Right arm   Patient Position: Sitting   Pulse: 55   Weight: 84.4 kg (186 lb)   Height: 1.829 m (6')        Objective   Physical Exam  Constitutional:       Appearance: Normal appearance.   HENT:      Nose: Nose  normal.   Neck:      Vascular: No carotid bruit.   Cardiovascular:      Rate and Rhythm: Normal rate.      Pulses: Normal pulses.      Heart sounds: Normal heart sounds.   Pulmonary:      Effort: Pulmonary effort is normal.   Abdominal:      General: Bowel sounds are normal.      Palpations: Abdomen is soft.   Musculoskeletal:         General: Normal range of motion.      Cervical back: Normal range of motion.      Right lower leg: No edema.      Left lower leg: No edema.   Skin:     General: Skin is warm and dry.   Neurological:      General: No focal deficit present.      Mental Status: He is alert.   Psychiatric:         Mood and Affect: Mood normal.         Behavior: Behavior normal.         Thought Content: Thought content normal.         Judgment: Judgment normal.         Allergies  Patient has no known allergies.     Current Medications    Current Outpatient Medications:     albuterol 90 mcg/actuation inhaler, Inhale 2 puffs every 4 hours if needed for shortness of breath., Disp: , Rfl:     amiodarone (Pacerone) 200 mg tablet, Take 1 tablet (200 mg) by mouth once daily., Disp: 30 tablet, Rfl: 5    apixaban (Eliquis) 5 mg tablet, Take 1 tablet (5 mg) by mouth 2 times a day., Disp: 180 tablet, Rfl: 3    aspirin 81 mg EC tablet, Take 1 tablet (81 mg) by mouth once daily., Disp: 90 tablet, Rfl: 3    atorvastatin (Lipitor) 80 mg tablet, Take 1 tablet (80 mg) by mouth once daily at bedtime., Disp: 90 tablet, Rfl: 3    budesonide-formoteroL (Symbicort) 160-4.5 mcg/actuation inhaler, Inhale 2 puffs 2 times a day. Rinse mouth after use, Disp: , Rfl:     metoprolol succinate XL (Toprol-XL) 50 mg 24 hr tablet, Take 1 tablet (50 mg) by mouth once daily. (Patient taking differently: Take 1 tablet (50 mg) by mouth 2 times a day.), Disp: 90 tablet, Rfl: 0    nitroglycerin (Nitrostat) 0.4 mg SL tablet, Place 1 tablet (0.4 mg) under the tongue every 5 minutes if needed for chest pain., Disp: 25 tablet, Rfl: 11     spironolactone (Aldactone) 25 mg tablet, Take 1 tablet (25 mg) by mouth once daily., Disp: 90 tablet, Rfl: 0    nicotine (Nicoderm CQ) 21 mg/24 hr patch, Place 1 patch over 24 hours on the skin once every 24 hours., Disp: 30 patch, Rfl: 0                     Assessment/Plan   1. Atherosclerosis of native coronary artery, unspecified whether angina present, unspecified whether native or transplanted heart  Follow Up In Cardiology      2. Coronary arteriosclerosis after percutaneous transluminal coronary angioplasty (PTCA)        3. Hx of acute myocardial infarction        4. Status post percutaneous transluminal coronary angioplasty        5. Atrial flutter, unspecified type (Multi)        6. High risk medication use        7. Ischemic cardiomyopathy        8. Mixed hyperlipidemia        9. Essential hypertension        10. Chronic obstructive pulmonary disease, unspecified COPD type (Multi)        11. Cough, unspecified type        12. Cigar smoker        13. BMI 25.0-25.9,adult                 Scribe Attestation  By signing my name below, I, Mitzi TORRES RN   , Scribe   attest that this documentation has been prepared under the direction and in the presence of North Pena DO.     Provider Attestation - Scribe documentation    All medical record entries made by the Scribe were at my direction and personally dictated by me. I have reviewed the chart and agree that the record accurately reflects my personal performance of the history, physical exam, discussion and plan.

## 2024-09-04 NOTE — LETTER
September 4, 2024     Kalyani Wills, APRN-CNP  [address] [place] [address]    Patient: Truong Shanks   YOB: 1970   Date of Visit: 9/4/2024       Dear Dr. Kalyani Wills, APRN-CNP:    Thank you for referring Truong Shanks to me for evaluation. Below are my notes for this consultation.  If you have questions, please do not hesitate to call me. I look forward to following your patient along with you.       Sincerely,     North Pena, DO      CC: No Recipients  ______________________________________________________________________________________    Subjective   Truong Shanks is a 54 y.o. male       Chief Complaint    Follow-up          54-year-old gentleman returns for follow-up he is doing well he has no recurrence of atrial fibrillation, no hospitalizations and no angina or nitrate usage.  He is still smoking and we counseled him for 5 to 10 minutes today in smoking cessation.  He has not had any amiodarone surveillance testing as well.    He sustained anterior MI in May 2023 with primary revascularization of the mid LAD x2 drug-eluting stents in 1 drug-eluting stent to the diagonal side branch with overall excellent result, left ventricular function last measured during hospitalization was 45 to 50%.  He has ongoing chronic tobacco use and we counseled him for 5 minutes today on smoking cessation    His main complaint is cough due to irritability of his throat it is episodic in nature but when he begins his coughing spell at his prolonged.    Today he is in normal sinus rhythm currently on amiodarone 200 daily, ECG with normal sinus rhythm left atrial enlargement, anteroseptal infarction pattern of unknown age, abnormal ECG    Recommendations: Decrease amiodarone to 100 daily, obtain amiodarone surveillance labs and pulmonary function testing, stop losartan for 4 weeks to see how his cough symptoms respond if no response, reinitiate losartan, follow-up in 6  months.    We did discuss possibility of ablation for atrial flutter but at this juncture he is in sinus rhythm doing well and gave him the option of waiting to see if atrial flutter comes back and if so we can pull the trigger on referral to EP at that time         Review of Systems   All other systems reviewed and are negative.           Vitals:    09/04/24 0909   BP: 102/64   BP Location: Right arm   Patient Position: Sitting   Pulse: 55   Weight: 84.4 kg (186 lb)   Height: 1.829 m (6')        Objective   Physical Exam  Constitutional:       Appearance: Normal appearance.   HENT:      Nose: Nose normal.   Neck:      Vascular: No carotid bruit.   Cardiovascular:      Rate and Rhythm: Normal rate.      Pulses: Normal pulses.      Heart sounds: Normal heart sounds.   Pulmonary:      Effort: Pulmonary effort is normal.   Abdominal:      General: Bowel sounds are normal.      Palpations: Abdomen is soft.   Musculoskeletal:         General: Normal range of motion.      Cervical back: Normal range of motion.      Right lower leg: No edema.      Left lower leg: No edema.   Skin:     General: Skin is warm and dry.   Neurological:      General: No focal deficit present.      Mental Status: He is alert.   Psychiatric:         Mood and Affect: Mood normal.         Behavior: Behavior normal.         Thought Content: Thought content normal.         Judgment: Judgment normal.         Allergies  Patient has no known allergies.     Current Medications    Current Outpatient Medications:   •  albuterol 90 mcg/actuation inhaler, Inhale 2 puffs every 4 hours if needed for shortness of breath., Disp: , Rfl:   •  amiodarone (Pacerone) 200 mg tablet, Take 1 tablet (200 mg) by mouth once daily., Disp: 30 tablet, Rfl: 5  •  apixaban (Eliquis) 5 mg tablet, Take 1 tablet (5 mg) by mouth 2 times a day., Disp: 180 tablet, Rfl: 3  •  aspirin 81 mg EC tablet, Take 1 tablet (81 mg) by mouth once daily., Disp: 90 tablet, Rfl: 3  •  atorvastatin  (Lipitor) 80 mg tablet, Take 1 tablet (80 mg) by mouth once daily at bedtime., Disp: 90 tablet, Rfl: 3  •  budesonide-formoteroL (Symbicort) 160-4.5 mcg/actuation inhaler, Inhale 2 puffs 2 times a day. Rinse mouth after use, Disp: , Rfl:   •  metoprolol succinate XL (Toprol-XL) 50 mg 24 hr tablet, Take 1 tablet (50 mg) by mouth once daily. (Patient taking differently: Take 1 tablet (50 mg) by mouth 2 times a day.), Disp: 90 tablet, Rfl: 0  •  nitroglycerin (Nitrostat) 0.4 mg SL tablet, Place 1 tablet (0.4 mg) under the tongue every 5 minutes if needed for chest pain., Disp: 25 tablet, Rfl: 11  •  spironolactone (Aldactone) 25 mg tablet, Take 1 tablet (25 mg) by mouth once daily., Disp: 90 tablet, Rfl: 0  •  nicotine (Nicoderm CQ) 21 mg/24 hr patch, Place 1 patch over 24 hours on the skin once every 24 hours., Disp: 30 patch, Rfl: 0                     Assessment/Plan   1. Atherosclerosis of native coronary artery, unspecified whether angina present, unspecified whether native or transplanted heart  Follow Up In Cardiology      2. Coronary arteriosclerosis after percutaneous transluminal coronary angioplasty (PTCA)        3. Hx of acute myocardial infarction        4. Status post percutaneous transluminal coronary angioplasty        5. Atrial flutter, unspecified type (Multi)        6. High risk medication use        7. Ischemic cardiomyopathy        8. Mixed hyperlipidemia        9. Essential hypertension        10. Chronic obstructive pulmonary disease, unspecified COPD type (Multi)        11. Cough, unspecified type        12. Cigar smoker        13. BMI 25.0-25.9,adult                 Scribe Attestation  By signing my name below, I, Mitzi TORRES RN   , Scribe   attest that this documentation has been prepared under the direction and in the presence of North Pena DO.     Provider Attestation - Scribe documentation    All medical record entries made by the Scribe were at my direction and personally dictated  by me. I have reviewed the chart and agree that the record accurately reflects my personal performance of the history, physical exam, discussion and plan.

## 2024-09-04 NOTE — PATIENT INSTRUCTIONS
Please bring all medicines, vitamins, and herbal supplements with you when you come to the office.    Prescriptions will not be filled unless you are compliant with your follow up appointments or have a follow up appointment scheduled as per instruction of your physician. Refills should be requested at the time of your visit.     BMI was above normal measurement. Current weight: 84.4 kg (186 lb)  Weight change since last visit (-) denotes wt loss 3.6 lbs   Weight loss needed to achieve BMI 25: 2.1 Lbs  Weight loss needed to achieve BMI 30: -34.7 Lbs  Provided instructions on dietary changes  Provided instructions on exercise.    Stop losartan for 1 month and then evaluate how your cough is.     Change amiodarone dose to 100 mg daily.     Get breathing test, chest xray and labs

## 2024-09-09 LAB
NON-UH HIE ANION GAP: 10.9 (ref 6–15)
NON-UH HIE ASPARTATE AMINO TRANSFERASE: 13 U/L (ref 13–39)
NON-UH HIE BLOOD UREA NITROGEN: 18 MG/DL (ref 7–25)
NON-UH HIE CALCIUM: 9.1 MG/DL (ref 8.6–10.3)
NON-UH HIE CARBON DIOXIDE: 26.4 MMOL/L (ref 21–31)
NON-UH HIE CHLORIDE: 106 MMOL/L (ref 98–107)
NON-UH HIE CREATININE: 1.21 MG/DL (ref 0.7–1.3)
NON-UH HIE ESTIMATED GFR: > 60
NON-UH HIE GLUCOSE: 64 MG/DL (ref 70–100)
NON-UH HIE POTASSIUM: 4.3 MMOL/L (ref 3.5–5.1)
NON-UH HIE SODIUM: 139 MMOL/L (ref 136–145)
NON-UH HIE THYROID STIMULATING HORMONE: 3.14 U[IU]/ML (ref 0.45–5.33)

## 2024-09-20 DIAGNOSIS — I10 ESSENTIAL HYPERTENSION: ICD-10-CM

## 2024-09-20 DIAGNOSIS — I48.92 ATRIAL FLUTTER, UNSPECIFIED TYPE (MULTI): ICD-10-CM

## 2024-09-20 RX ORDER — METOPROLOL SUCCINATE 50 MG/1
50 TABLET, EXTENDED RELEASE ORAL DAILY
COMMUNITY

## 2024-09-20 RX ORDER — LOSARTAN POTASSIUM 25 MG/1
25 TABLET ORAL DAILY
Qty: 90 TABLET | Refills: 3 | Status: SHIPPED | OUTPATIENT
Start: 2024-09-20 | End: 2025-09-20

## 2024-09-20 RX ORDER — DILTIAZEM HYDROCHLORIDE 240 MG/1
240 CAPSULE, COATED, EXTENDED RELEASE ORAL DAILY
Qty: 90 CAPSULE | Refills: 3 | Status: SHIPPED | OUTPATIENT
Start: 2024-09-20 | End: 2025-09-20

## 2024-09-20 NOTE — TELEPHONE ENCOUNTER
Patient presented to the office with his bottles of active medications he is taking. Medication list updated. Patient is requesting refills be sent to last until POV 03/25/2024. Please review medications. Diltiazem 240 daily and losartan 25 daily were not on list from OV 09/04/2024. Thank you.     Pharmacy is [facility] in [place]

## 2024-09-24 DIAGNOSIS — I25.5 ISCHEMIC CARDIOMYOPATHY: ICD-10-CM

## 2024-09-24 DIAGNOSIS — I10 ESSENTIAL HYPERTENSION: ICD-10-CM

## 2024-09-24 DIAGNOSIS — I21.3 ST ELEVATION MYOCARDIAL INFARCTION (STEMI), UNSPECIFIED ARTERY (MULTI): ICD-10-CM

## 2024-09-25 RX ORDER — SPIRONOLACTONE 25 MG/1
25 TABLET ORAL DAILY
Qty: 90 TABLET | Refills: 3 | Status: SHIPPED | OUTPATIENT
Start: 2024-09-25 | End: 2025-09-25

## 2024-09-25 RX ORDER — METOPROLOL SUCCINATE 50 MG/1
50 TABLET, EXTENDED RELEASE ORAL DAILY
Qty: 90 TABLET | Refills: 3 | Status: SHIPPED | OUTPATIENT
Start: 2024-09-25 | End: 2025-09-25

## 2024-09-26 ENCOUNTER — APPOINTMENT (OUTPATIENT)
Dept: CARDIOLOGY | Facility: CLINIC | Age: 54
End: 2024-09-26
Payer: COMMERCIAL

## 2025-02-04 ENCOUNTER — TELEPHONE (OUTPATIENT)
Dept: CARDIOLOGY | Facility: CLINIC | Age: 55
End: 2025-02-04
Payer: COMMERCIAL

## 2025-02-04 NOTE — TELEPHONE ENCOUNTER
Started Saturday morning  with a head cold, Saturday evening started to have bad episodes of shortness of breath. Denies chest pain. Denies palpitations. Advised patient does not sound cardiac related, more related to the illness he has, advised to reach out to PCP as well. Patient was requesting a sooner OV with Dr. North Pena, DO to evaluate his symptoms of SOB or testing recommended.     Patient reports went to urgent care, Tested negative for covid and flu at urgent care. Urgent care did provide RX for prednisone 50mg and azithromycin 250mg x5 days, inquiring ok to use with cardiac medications.

## 2025-02-07 NOTE — TELEPHONE ENCOUNTER
Phoned patient, he states he saw his pcp Wednesday who diagnosed him with pneumonia. Patient reports antibiotics were changes and he is feeling a lot better.

## 2025-02-18 DIAGNOSIS — I10 ESSENTIAL HYPERTENSION: ICD-10-CM

## 2025-02-18 DIAGNOSIS — I25.5 ISCHEMIC CARDIOMYOPATHY: ICD-10-CM

## 2025-02-18 DIAGNOSIS — E78.2 MIXED HYPERLIPIDEMIA: ICD-10-CM

## 2025-02-18 DIAGNOSIS — I48.92 ATRIAL FLUTTER, UNSPECIFIED TYPE (MULTI): ICD-10-CM

## 2025-02-18 DIAGNOSIS — I21.3 ST ELEVATION MYOCARDIAL INFARCTION (STEMI), UNSPECIFIED ARTERY (MULTI): ICD-10-CM

## 2025-02-20 RX ORDER — METOPROLOL SUCCINATE 50 MG/1
50 TABLET, EXTENDED RELEASE ORAL DAILY
Qty: 90 TABLET | Refills: 3 | Status: SHIPPED | OUTPATIENT
Start: 2025-02-20 | End: 2026-02-20

## 2025-02-20 RX ORDER — LOSARTAN POTASSIUM 25 MG/1
25 TABLET ORAL DAILY
Qty: 90 TABLET | Refills: 3 | Status: SHIPPED | OUTPATIENT
Start: 2025-02-20 | End: 2026-02-20

## 2025-02-20 RX ORDER — SPIRONOLACTONE 25 MG/1
25 TABLET ORAL DAILY
Qty: 90 TABLET | Refills: 3 | Status: SHIPPED | OUTPATIENT
Start: 2025-02-20 | End: 2026-02-20

## 2025-02-20 RX ORDER — AMIODARONE HYDROCHLORIDE 100 MG/1
100 TABLET ORAL DAILY
Qty: 90 TABLET | Refills: 3 | Status: SHIPPED | OUTPATIENT
Start: 2025-02-20 | End: 2026-02-20

## 2025-02-20 RX ORDER — DILTIAZEM HYDROCHLORIDE 240 MG/1
240 CAPSULE, COATED, EXTENDED RELEASE ORAL DAILY
Qty: 90 CAPSULE | Refills: 3 | Status: SHIPPED | OUTPATIENT
Start: 2025-02-20 | End: 2026-02-20

## 2025-02-20 RX ORDER — ATORVASTATIN CALCIUM 80 MG/1
80 TABLET, FILM COATED ORAL NIGHTLY
Qty: 90 TABLET | Refills: 3 | Status: SHIPPED | OUTPATIENT
Start: 2025-02-20 | End: 2026-02-20

## 2025-03-06 ENCOUNTER — APPOINTMENT (OUTPATIENT)
Dept: CARDIOLOGY | Facility: CLINIC | Age: 55
End: 2025-03-06
Payer: COMMERCIAL

## 2025-03-13 ENCOUNTER — APPOINTMENT (OUTPATIENT)
Dept: CARDIOLOGY | Facility: CLINIC | Age: 55
End: 2025-03-13
Payer: COMMERCIAL

## 2025-03-13 VITALS
HEART RATE: 63 BPM | SYSTOLIC BLOOD PRESSURE: 116 MMHG | DIASTOLIC BLOOD PRESSURE: 62 MMHG | HEIGHT: 72 IN | WEIGHT: 184 LBS | BODY MASS INDEX: 24.92 KG/M2

## 2025-03-13 DIAGNOSIS — F17.290 CIGAR SMOKER: ICD-10-CM

## 2025-03-13 DIAGNOSIS — Z79.899 HIGH RISK MEDICATION USE: ICD-10-CM

## 2025-03-13 DIAGNOSIS — I25.10 CORONARY ARTERIOSCLEROSIS AFTER PERCUTANEOUS TRANSLUMINAL CORONARY ANGIOPLASTY (PTCA): ICD-10-CM

## 2025-03-13 DIAGNOSIS — I48.92 ATRIAL FLUTTER, UNSPECIFIED TYPE (MULTI): ICD-10-CM

## 2025-03-13 DIAGNOSIS — I48.0 PAF (PAROXYSMAL ATRIAL FIBRILLATION) (MULTI): ICD-10-CM

## 2025-03-13 DIAGNOSIS — I25.2 HX OF ACUTE MYOCARDIAL INFARCTION: ICD-10-CM

## 2025-03-13 DIAGNOSIS — Z98.61 CORONARY ARTERIOSCLEROSIS AFTER PERCUTANEOUS TRANSLUMINAL CORONARY ANGIOPLASTY (PTCA): ICD-10-CM

## 2025-03-13 DIAGNOSIS — I25.10 ATHEROSCLEROSIS OF NATIVE CORONARY ARTERY OF NATIVE HEART WITHOUT ANGINA PECTORIS: ICD-10-CM

## 2025-03-13 PROCEDURE — 3008F BODY MASS INDEX DOCD: CPT | Performed by: INTERNAL MEDICINE

## 2025-03-13 PROCEDURE — 3074F SYST BP LT 130 MM HG: CPT | Performed by: INTERNAL MEDICINE

## 2025-03-13 PROCEDURE — 99406 BEHAV CHNG SMOKING 3-10 MIN: CPT | Performed by: INTERNAL MEDICINE

## 2025-03-13 PROCEDURE — 3078F DIAST BP <80 MM HG: CPT | Performed by: INTERNAL MEDICINE

## 2025-03-13 PROCEDURE — 99214 OFFICE O/P EST MOD 30 MIN: CPT | Performed by: INTERNAL MEDICINE

## 2025-03-13 PROCEDURE — 93000 ELECTROCARDIOGRAM COMPLETE: CPT | Performed by: INTERNAL MEDICINE

## 2025-03-13 PROCEDURE — 4004F PT TOBACCO SCREEN RCVD TLK: CPT | Performed by: INTERNAL MEDICINE

## 2025-03-13 NOTE — LETTER
March 13, 2025     Kalyani Wills, APRN-CNP  [address] [place] [address]    Patient: Truong Shanks   YOB: 1970   Date of Visit: 3/13/2025       Dear Dr. Kalyani Wills, APRN-CNP:    Thank you for referring Truong Shanks to me for evaluation. Below are my notes for this consultation.  If you have questions, please do not hesitate to call me. I look forward to following your patient along with you.       Sincerely,     North Pena, DO      CC: No Recipients  ______________________________________________________________________________________    Subjective   Truong Shanks is a 54 y.o. male       Chief Complaint    Follow-up               6m    54-year-old gentleman returns for 6-month follow-up for continued evaluation and surveillance for known coronary disease and previous MI.    He is doing well, denies any cardiovascular events or complaints or nitrate usage or repeat hospitalizations.  In January he did have acute upper respiratory infection/pneumonia treated as an outpatient with antibiotics.  He still smoking and we counseled him for 5 minutes today on smoking cessation and offered alternatives.    He sustained anterior MI in May 2023 with primary revascularization of the mid LAD x2 drug-eluting stents in 1 drug-eluting stent to the diagonal side branch with overall excellent result, left ventricular function last measured during hospitalization was 45 to 50%.    Today's ECG demonstrates sinus rhythm, with left atrial enlargement, possible anteroseptal infarct age undetermined.    Patient remains on low-dose amiodarone, given his youthful age, and sustained sinus rhythm at this juncture like to stop his amiodarone, see what his rhythm does and as professed in my previous note if recurrence of a flutter, will consider proceeding with ablation.  In the meantime continue with current therapies and follow-up in 6 to 9 months along with smoking cessation.         ROS          Vitals:    03/13/25 0956   BP: 116/62   BP Location: Right arm   Patient Position: Sitting   Pulse: 63   Weight: 83.5 kg (184 lb)   Height: 1.829 m (6')      EKG done in office today     Objective   Physical Exam  Constitutional:       Appearance: Normal appearance.   HENT:      Nose: Nose normal.   Neck:      Vascular: No carotid bruit.   Cardiovascular:      Rate and Rhythm: Normal rate.      Pulses: Normal pulses.      Heart sounds: Normal heart sounds.   Pulmonary:      Effort: Pulmonary effort is normal.      Breath sounds: Wheezing present.   Abdominal:      General: Bowel sounds are normal.      Palpations: Abdomen is soft.   Musculoskeletal:         General: Normal range of motion.      Cervical back: Normal range of motion.      Right lower leg: No edema.      Left lower leg: No edema.   Skin:     General: Skin is warm and dry.   Neurological:      General: No focal deficit present.      Mental Status: He is alert.   Psychiatric:         Mood and Affect: Mood normal.         Behavior: Behavior normal.         Thought Content: Thought content normal.         Judgment: Judgment normal.         Allergies  Patient has no known allergies.     Current Medications    Current Outpatient Medications:   •  albuterol 90 mcg/actuation inhaler, Inhale 2 puffs every 4 hours if needed for shortness of breath., Disp: , Rfl:   •  apixaban (Eliquis) 5 mg tablet, Take 1 tablet (5 mg) by mouth 2 times a day., Disp: 180 tablet, Rfl: 3  •  aspirin 81 mg EC tablet, Take 1 tablet (81 mg) by mouth once daily., Disp: 90 tablet, Rfl: 3  •  atorvastatin (Lipitor) 80 mg tablet, Take 1 tablet (80 mg) by mouth once daily at bedtime., Disp: 90 tablet, Rfl: 3  •  budesonide-formoteroL (Symbicort) 160-4.5 mcg/actuation inhaler, Inhale 2 puffs 2 times a day. Rinse mouth after use, Disp: , Rfl:   •  dilTIAZem CD (Cardizem CD) 240 mg 24 hr capsule, Take 1 capsule (240 mg) by mouth once daily., Disp: 90 capsule, Rfl: 3  •  losartan  (Cozaar) 25 mg tablet, Take 1 tablet (25 mg) by mouth once daily., Disp: 90 tablet, Rfl: 3  •  metoprolol succinate XL (Toprol-XL) 50 mg 24 hr tablet, Take 1 tablet (50 mg) by mouth once daily. Do not crush or chew., Disp: 90 tablet, Rfl: 3  •  nitroglycerin (Nitrostat) 0.4 mg SL tablet, Place 1 tablet (0.4 mg) under the tongue every 5 minutes if needed for chest pain., Disp: 25 tablet, Rfl: 11  •  spironolactone (Aldactone) 25 mg tablet, Take 1 tablet (25 mg) by mouth once daily., Disp: 90 tablet, Rfl: 3                     Assessment/Plan   1. Atherosclerosis of native coronary artery of native heart without angina pectoris        2. Hx of acute myocardial infarction        3. Coronary arteriosclerosis after percutaneous transluminal coronary angioplasty (PTCA)        4. PAF (paroxysmal atrial fibrillation) (Multi)        5. Atrial flutter, unspecified type (Multi)  Follow Up In Cardiology      6. High risk medication use        7. Cigar smoker        8. BMI 24.0-24.9, adult                 Scribe Attestation  By signing my name below, ILis LPN  , Scribe   attest that this documentation has been prepared under the direction and in the presence of North Pena DO.     Provider Attestation - Scribe documentation    All medical record entries made by the Scribe were at my direction and personally dictated by me. I have reviewed the chart and agree that the record accurately reflects my personal performance of the history, physical exam, discussion and plan.

## 2025-03-13 NOTE — PATIENT INSTRUCTIONS
Please bring all medicines, vitamins, and herbal supplements with you when you come to the office.    Prescriptions will not be filled unless you are compliant with your follow up appointments or have a follow up appointment scheduled as per instruction of your physician. Refills should be requested at the time of your visit.     BMI normal.

## 2025-03-13 NOTE — PROGRESS NOTES
Subjective   Truong Shanks is a 54 y.o. male       Chief Complaint    Follow-up               6m    54-year-old gentleman returns for 6-month follow-up for continued evaluation and surveillance for known coronary disease and previous MI.    He is doing well, denies any cardiovascular events or complaints or nitrate usage or repeat hospitalizations.  In January he did have acute upper respiratory infection/pneumonia treated as an outpatient with antibiotics.  He still smoking and we counseled him for 5 minutes today on smoking cessation and offered alternatives.    He sustained anterior MI in May 2023 with primary revascularization of the mid LAD x2 drug-eluting stents in 1 drug-eluting stent to the diagonal side branch with overall excellent result, left ventricular function last measured during hospitalization was 45 to 50%.    Today's ECG demonstrates sinus rhythm, with left atrial enlargement, possible anteroseptal infarct age undetermined.    Patient remains on low-dose amiodarone, given his youthful age, and sustained sinus rhythm at this juncture like to stop his amiodarone, see what his rhythm does and as professed in my previous note if recurrence of a flutter, will consider proceeding with ablation.  In the meantime continue with current therapies and follow-up in 6 to 9 months along with smoking cessation.         ROS         Vitals:    03/13/25 0956   BP: 116/62   BP Location: Right arm   Patient Position: Sitting   Pulse: 63   Weight: 83.5 kg (184 lb)   Height: 1.829 m (6')      EKG done in office today     Objective   Physical Exam  Constitutional:       Appearance: Normal appearance.   HENT:      Nose: Nose normal.   Neck:      Vascular: No carotid bruit.   Cardiovascular:      Rate and Rhythm: Normal rate.      Pulses: Normal pulses.      Heart sounds: Normal heart sounds.   Pulmonary:      Effort: Pulmonary effort is normal.      Breath sounds: Wheezing present.   Abdominal:      General: Bowel  sounds are normal.      Palpations: Abdomen is soft.   Musculoskeletal:         General: Normal range of motion.      Cervical back: Normal range of motion.      Right lower leg: No edema.      Left lower leg: No edema.   Skin:     General: Skin is warm and dry.   Neurological:      General: No focal deficit present.      Mental Status: He is alert.   Psychiatric:         Mood and Affect: Mood normal.         Behavior: Behavior normal.         Thought Content: Thought content normal.         Judgment: Judgment normal.         Allergies  Patient has no known allergies.     Current Medications    Current Outpatient Medications:     albuterol 90 mcg/actuation inhaler, Inhale 2 puffs every 4 hours if needed for shortness of breath., Disp: , Rfl:     apixaban (Eliquis) 5 mg tablet, Take 1 tablet (5 mg) by mouth 2 times a day., Disp: 180 tablet, Rfl: 3    aspirin 81 mg EC tablet, Take 1 tablet (81 mg) by mouth once daily., Disp: 90 tablet, Rfl: 3    atorvastatin (Lipitor) 80 mg tablet, Take 1 tablet (80 mg) by mouth once daily at bedtime., Disp: 90 tablet, Rfl: 3    budesonide-formoteroL (Symbicort) 160-4.5 mcg/actuation inhaler, Inhale 2 puffs 2 times a day. Rinse mouth after use, Disp: , Rfl:     dilTIAZem CD (Cardizem CD) 240 mg 24 hr capsule, Take 1 capsule (240 mg) by mouth once daily., Disp: 90 capsule, Rfl: 3    losartan (Cozaar) 25 mg tablet, Take 1 tablet (25 mg) by mouth once daily., Disp: 90 tablet, Rfl: 3    metoprolol succinate XL (Toprol-XL) 50 mg 24 hr tablet, Take 1 tablet (50 mg) by mouth once daily. Do not crush or chew., Disp: 90 tablet, Rfl: 3    nitroglycerin (Nitrostat) 0.4 mg SL tablet, Place 1 tablet (0.4 mg) under the tongue every 5 minutes if needed for chest pain., Disp: 25 tablet, Rfl: 11    spironolactone (Aldactone) 25 mg tablet, Take 1 tablet (25 mg) by mouth once daily., Disp: 90 tablet, Rfl: 3                     Assessment/Plan   1. Atherosclerosis of native coronary artery of native  heart without angina pectoris        2. Hx of acute myocardial infarction        3. Coronary arteriosclerosis after percutaneous transluminal coronary angioplasty (PTCA)        4. PAF (paroxysmal atrial fibrillation) (Multi)        5. Atrial flutter, unspecified type (Multi)  Follow Up In Cardiology      6. High risk medication use        7. Cigar smoker        8. BMI 24.0-24.9, adult                 Scribe Attestation  By signing my name below, ILis LPN Scribe   attest that this documentation has been prepared under the direction and in the presence of North Pena DO.     Provider Attestation - Scribe documentation    All medical record entries made by the Scribe were at my direction and personally dictated by me. I have reviewed the chart and agree that the record accurately reflects my personal performance of the history, physical exam, discussion and plan.

## 2025-04-21 ENCOUNTER — APPOINTMENT (OUTPATIENT)
Dept: CARDIOLOGY | Facility: CLINIC | Age: 55
End: 2025-04-21
Payer: COMMERCIAL

## 2025-04-22 ENCOUNTER — TELEPHONE (OUTPATIENT)
Dept: CARDIOLOGY | Facility: CLINIC | Age: 55
End: 2025-04-22

## 2025-04-22 ENCOUNTER — APPOINTMENT (OUTPATIENT)
Dept: CARDIOLOGY | Facility: CLINIC | Age: 55
End: 2025-04-22
Payer: COMMERCIAL

## 2025-04-22 DIAGNOSIS — I48.0 PAF (PAROXYSMAL ATRIAL FIBRILLATION) (MULTI): ICD-10-CM

## 2025-04-22 DIAGNOSIS — Z79.899 HIGH RISK MEDICATION USE: ICD-10-CM

## 2025-04-22 DIAGNOSIS — I48.92 ATRIAL FLUTTER, UNSPECIFIED TYPE (MULTI): ICD-10-CM

## 2025-04-22 NOTE — TELEPHONE ENCOUNTER
Patient had KOH monitor today. Baseline did not record. Attempted to reach to advised to send a baseline again. No answer or a/m

## 2025-05-05 PROCEDURE — 93272 ECG/REVIEW INTERPRET ONLY: CPT | Performed by: INTERNAL MEDICINE

## 2025-05-12 ENCOUNTER — TELEPHONE (OUTPATIENT)
Dept: CARDIOLOGY | Facility: CLINIC | Age: 55
End: 2025-05-12
Payer: COMMERCIAL

## 2025-05-12 NOTE — TELEPHONE ENCOUNTER
----- Message from North Pena sent at 5/12/2025  3:19 PM EDT -----  No new orders. Please call patient with normal result.  ----- Message -----  From: Lashawn Rowan MD  Sent: 5/5/2025  11:10 AM EDT  To: North Pena, DO

## 2025-05-12 NOTE — TELEPHONE ENCOUNTER
Result Communication    Resulted Orders   Holter Or Event Cardiac Monitor    Narrative    Patient had 7-day event monitor applied on 4/22/2025 and was removed on   4/29/2025 for symptoms of paroxysmal atrial fibrillation    2 tracings were sent for review including baseline both demonstrated   normal sinus rhythm with a heart rate of 53 bpm and 71 bpm respectively   with no symptoms reported         3:38 PM      Results were successfully communicated with the patient and they acknowledged their understanding.

## 2025-08-22 ENCOUNTER — TRANSCRIBE ORDERS (OUTPATIENT)
Dept: ADMINISTRATIVE | Age: 55
End: 2025-08-22

## 2025-08-22 DIAGNOSIS — K40.31: Primary | ICD-10-CM

## 2025-11-25 ENCOUNTER — APPOINTMENT (OUTPATIENT)
Dept: CARDIOLOGY | Facility: CLINIC | Age: 55
End: 2025-11-25
Payer: COMMERCIAL